# Patient Record
Sex: FEMALE | Race: WHITE | NOT HISPANIC OR LATINO | Employment: FULL TIME | URBAN - METROPOLITAN AREA
[De-identification: names, ages, dates, MRNs, and addresses within clinical notes are randomized per-mention and may not be internally consistent; named-entity substitution may affect disease eponyms.]

---

## 2024-01-18 ENCOUNTER — OFFICE VISIT (OUTPATIENT)
Dept: PRIMARY CARE | Facility: CLINIC | Age: 34
End: 2024-01-18
Payer: COMMERCIAL

## 2024-01-18 VITALS
DIASTOLIC BLOOD PRESSURE: 70 MMHG | BODY MASS INDEX: 31.9 KG/M2 | WEIGHT: 216 LBS | SYSTOLIC BLOOD PRESSURE: 112 MMHG | TEMPERATURE: 98.2 F

## 2024-01-18 DIAGNOSIS — M54.42 CHRONIC BILATERAL LOW BACK PAIN WITH BILATERAL SCIATICA: Primary | ICD-10-CM

## 2024-01-18 DIAGNOSIS — G89.29 CHRONIC BILATERAL LOW BACK PAIN WITH BILATERAL SCIATICA: Primary | ICD-10-CM

## 2024-01-18 DIAGNOSIS — M54.41 CHRONIC BILATERAL LOW BACK PAIN WITH BILATERAL SCIATICA: Primary | ICD-10-CM

## 2024-01-18 PROCEDURE — 99213 OFFICE O/P EST LOW 20 MIN: CPT | Performed by: FAMILY MEDICINE

## 2024-01-18 PROCEDURE — 1036F TOBACCO NON-USER: CPT | Performed by: FAMILY MEDICINE

## 2024-01-18 RX ORDER — CELECOXIB 200 MG/1
200 CAPSULE ORAL DAILY
Qty: 30 CAPSULE | Refills: 0 | Status: SHIPPED | OUTPATIENT
Start: 2024-01-18 | End: 2024-02-20 | Stop reason: ALTCHOICE

## 2024-01-18 ASSESSMENT — PATIENT HEALTH QUESTIONNAIRE - PHQ9
1. LITTLE INTEREST OR PLEASURE IN DOING THINGS: NOT AT ALL
2. FEELING DOWN, DEPRESSED OR HOPELESS: NOT AT ALL
SUM OF ALL RESPONSES TO PHQ9 QUESTIONS 1 AND 2: 0

## 2024-01-18 ASSESSMENT — ENCOUNTER SYMPTOMS: DEPRESSION: 0

## 2024-01-18 NOTE — PATIENT INSTRUCTIONS
I will prescribe celebrex for your back pain.  I will order an MRI of the lumbar spine.  Continue your exercises from physical therapy in the meantime.  Return in one month for a recheck.

## 2024-01-18 NOTE — PROGRESS NOTES
Subjective   Patient ID: [id number]     Milvia Villaseñor is a 33 y.o. female who presents for Back Pain (Patient states that the pain is also causing shortness of breath.).  HPI  She complains of back pain.  She has had it for years.  She states it has been worse over the last two years.  She has been sent for PT.  She completed a course of PT and does her stretching at home and it is not really helping.      She has pain in her legs , paresthesias.      She states the pain is causing her shortness of breath.  She has pain with moving around that makes her short of breath.  The pain is in the lower back and buttock region.          Objective     /70 (BP Location: Left arm, Patient Position: Sitting)   Temp 36.8 °C (98.2 °F) (Skin)   Wt 98 kg (216 lb)   BMI 31.90 kg/m²      Physical Exam  Constitutional:       Appearance: Normal appearance.   Musculoskeletal:         General: Tenderness (tender at lumbar paraspinals. spasm and guarding noted.) present. Normal range of motion.   Neurological:      Mental Status: She is alert.      Sensory: Sensory deficit (paresthesias in both legs.) present.      Motor: No weakness.      Coordination: Coordination normal.      Gait: Gait normal.      Deep Tendon Reflexes: Reflexes normal.         Assessment/Plan   Problem List Items Addressed This Visit    None  Visit Diagnoses       Chronic bilateral low back pain with bilateral sciatica    -  Primary    Relevant Medications    celecoxib (CeleBREX) 200 mg capsule    Other Relevant Orders    MR lumbar spine w and wo IV contrast        I will prescribe celebrex for your back pain.  I will order an MRI of the lumbar spine.  Continue your exercises from physical therapy in the meantime.  Return in one month for a recheck.    Jhon Snowden,

## 2024-01-30 ENCOUNTER — APPOINTMENT (OUTPATIENT)
Dept: RADIOLOGY | Facility: CLINIC | Age: 34
End: 2024-01-30
Payer: COMMERCIAL

## 2024-02-15 ENCOUNTER — APPOINTMENT (OUTPATIENT)
Dept: RADIOLOGY | Facility: HOSPITAL | Age: 34
End: 2024-02-15
Payer: COMMERCIAL

## 2024-02-15 ENCOUNTER — HOSPITAL ENCOUNTER (OUTPATIENT)
Dept: RADIOLOGY | Facility: HOSPITAL | Age: 34
Discharge: HOME | End: 2024-02-15
Payer: COMMERCIAL

## 2024-02-15 DIAGNOSIS — G89.29 CHRONIC BILATERAL LOW BACK PAIN WITH BILATERAL SCIATICA: ICD-10-CM

## 2024-02-15 DIAGNOSIS — M54.42 CHRONIC BILATERAL LOW BACK PAIN WITH BILATERAL SCIATICA: ICD-10-CM

## 2024-02-15 DIAGNOSIS — M54.41 CHRONIC BILATERAL LOW BACK PAIN WITH BILATERAL SCIATICA: ICD-10-CM

## 2024-02-15 PROCEDURE — 72158 MRI LUMBAR SPINE W/O & W/DYE: CPT

## 2024-02-15 PROCEDURE — 2550000001 HC RX 255 CONTRASTS: Performed by: FAMILY MEDICINE

## 2024-02-15 PROCEDURE — A9575 INJ GADOTERATE MEGLUMI 0.1ML: HCPCS | Performed by: FAMILY MEDICINE

## 2024-02-15 RX ORDER — GADOTERATE MEGLUMINE 376.9 MG/ML
19 INJECTION INTRAVENOUS
Status: COMPLETED | OUTPATIENT
Start: 2024-02-15 | End: 2024-02-15

## 2024-02-15 RX ADMIN — GADOTERATE MEGLUMINE 19 ML: 376.9 INJECTION INTRAVENOUS at 18:20

## 2024-02-16 ENCOUNTER — TELEPHONE (OUTPATIENT)
Dept: PRIMARY CARE | Facility: CLINIC | Age: 34
End: 2024-02-16
Payer: COMMERCIAL

## 2024-02-16 NOTE — TELEPHONE ENCOUNTER
Pt is calling asking is someone can give her a call to discuss her MRI results and what her next steps are now? Pt also wanted to know if she needed to follow up with you as well?

## 2024-02-17 DIAGNOSIS — M43.06 LUMBAR PARS DEFECT: ICD-10-CM

## 2024-02-17 DIAGNOSIS — M43.06 SPONDYLOLYSIS OF LUMBAR REGION: Primary | ICD-10-CM

## 2024-02-20 ENCOUNTER — TELEPHONE (OUTPATIENT)
Dept: PRIMARY CARE | Facility: CLINIC | Age: 34
End: 2024-02-20

## 2024-02-20 ENCOUNTER — OFFICE VISIT (OUTPATIENT)
Dept: PRIMARY CARE | Facility: CLINIC | Age: 34
End: 2024-02-20
Payer: COMMERCIAL

## 2024-02-20 VITALS
WEIGHT: 220.46 LBS | TEMPERATURE: 97.7 F | DIASTOLIC BLOOD PRESSURE: 82 MMHG | SYSTOLIC BLOOD PRESSURE: 134 MMHG | BODY MASS INDEX: 32.56 KG/M2

## 2024-02-20 DIAGNOSIS — M43.00 PARS DEFECT: ICD-10-CM

## 2024-02-20 DIAGNOSIS — M43.06 LUMBAR SPONDYLOLYSIS: Primary | ICD-10-CM

## 2024-02-20 DIAGNOSIS — M43.10 ANTEROLISTHESIS: ICD-10-CM

## 2024-02-20 PROCEDURE — 1036F TOBACCO NON-USER: CPT | Performed by: FAMILY MEDICINE

## 2024-02-20 PROCEDURE — 99213 OFFICE O/P EST LOW 20 MIN: CPT | Performed by: FAMILY MEDICINE

## 2024-02-20 NOTE — PROGRESS NOTES
Subjective   Patient ID: [id number]     Milvia Villaseñor is a 34 y.o. female who presents for Form Completion.  HPI  She is here regarding disability forms.  She was seen on 1/18/24 for back pain.  An MRI was done.  This showed a pars defect and possible spondylolysis.  Xrays were ordered to evaluate for this.  She had anterolisthesis of L% on S1 by 3 mm.      She has had bad pain for about a month. She has a neurosurgery appointment on 4/29/24.  She wants to get in with someone sooner.  I will order a neurosurgery referral which she will take to try [facility].      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 She has to lift shelves and bend down to get things.  She has to climb ladders up twelve feet.      Objective     /82 (BP Location: Right arm, Patient Position: Sitting)   Temp 36.5 °C (97.7 °F) (Skin)   Wt 100 kg (220 lb 7.4 oz)   BMI 32.56 kg/m²      Physical Exam  Constitutional:       Comments: Standing through exam.  Sitting hurts.     Musculoskeletal:      Comments: Tender at the lumbar spine.  Limited motion.  Feels more comfortable to stand.     Neurological:      Mental Status: She is alert.         Assessment/Plan   Problem List Items Addressed This Visit    None  Visit Diagnoses       Lumbar spondylolysis    -  Primary    Relevant Orders    Referral to Physical Therapy    Referral to Neurosurgery    Pars defect        Relevant Orders    Referral to Physical Therapy    Referral to Neurosurgery    Anterolisthesis        Relevant Orders    Referral to Physical Therapy    Referral to Neurosurgery        Disability forms were competed today.  Ordered neurosurgery referral and PT.    Jhon Snowden DO

## 2024-03-07 ENCOUNTER — HOSPITAL ENCOUNTER (OUTPATIENT)
Dept: RADIOLOGY | Facility: HOSPITAL | Age: 34
Discharge: HOME | End: 2024-03-07
Payer: COMMERCIAL

## 2024-03-07 ENCOUNTER — EVALUATION (OUTPATIENT)
Dept: PHYSICAL THERAPY | Facility: CLINIC | Age: 34
End: 2024-03-07
Payer: COMMERCIAL

## 2024-03-07 DIAGNOSIS — M43.06 LUMBAR SPONDYLOLYSIS: ICD-10-CM

## 2024-03-07 DIAGNOSIS — M43.00 PARS DEFECT: ICD-10-CM

## 2024-03-07 DIAGNOSIS — M43.10 ANTEROLISTHESIS: ICD-10-CM

## 2024-03-07 DIAGNOSIS — M43.06 LUMBAR PARS DEFECT: ICD-10-CM

## 2024-03-07 DIAGNOSIS — M43.06 SPONDYLOLYSIS OF LUMBAR REGION: ICD-10-CM

## 2024-03-07 PROCEDURE — 72110 X-RAY EXAM L-2 SPINE 4/>VWS: CPT

## 2024-03-07 PROCEDURE — 97110 THERAPEUTIC EXERCISES: CPT | Mod: GP

## 2024-03-07 PROCEDURE — 72110 X-RAY EXAM L-2 SPINE 4/>VWS: CPT | Performed by: RADIOLOGY

## 2024-03-07 PROCEDURE — 97161 PT EVAL LOW COMPLEX 20 MIN: CPT | Mod: GP

## 2024-03-07 NOTE — PROGRESS NOTES
PHYSICAL THERAPY EVALUATION AND TREATMENT    Patient Name: Milvia Villaseñor  MRN: [id number]  Today's Date: 3/8/2024  Time Calculation  Start Time: 1549  Stop Time: 1630  Time Calculation (min): 41 min    PT Evaluation Time Entry  PT Evaluation (Low) Time Entry: 26  PT Therapeutic Procedures Time Entry  Therapeutic Exercise Time Entry: 15                   Insurance:  Visit number: 1   Insurance Type: Payor: [employer]THEM  30 V PCY 0 used     Referred by: Jhon Snowden DO         Assessment:     Milvia Villaseñor  is a 34 y.o. old patient who participated in a physical therapy evaluation today due to recent acute flare up of chronic LBP. Pt presents with signs and symptoms consistent with no clear directional preference at evaluation - radiating symptoms not present during evaluation: pt falls into stabilization category. her impairments include: tenderness, strength deficits, pain, ROM deficits. Due to these impairments, she has the following functional limitations and participation restrictions: sleep, work tasks: lifting/twisting/ladders. Skilled physical therapy services are appropriate and beneficial in order to achieve measurable and meaningful change in the above objective tests and measures. Utilization of skilled physical therapy services will aid in advancing her functional status and attaining her therapy-related goals. The patient verbalized understanding and is in agreement with all goals and plan of care.  Plan of care was developed with input and agreement by the patient    Plan: Treatment/Interventions: Aquatic therapy, Cryotherapy, Dry needling, Education/ Instruction, Electrical stimulation, Gait training, Manual therapy, Neuromuscular re-education, Taping techniques, Therapeutic activities, Therapeutic exercises (no lumbar joint mobs or mechanical traction)  PT Plan: Skilled PT  PT Frequency: 1 time per week  Duration: 6-8 weeks  Rehab Potential: Good  Plan of Care Agreement: Patient  NV: assess  "response to HEP. Manual PRN. Core/hip/glute strengthening in neutral. See if Xray results in EMR.       Therapy Diagnosis:   1. Lumbar spondylolysis  Referral to Physical Therapy    Follow Up In Physical Therapy      2. Pars defect  Referral to Physical Therapy    Follow Up In Physical Therapy      3. Anterolisthesis  Referral to Physical Therapy    Follow Up In Physical Therapy          Subjective    Onset: 5 years ago  YONATHAN: none, gradual onset, recently worsening in February when putting on pants, pulled back   Pain location: mid low back, radiating down bilat buttocks down posterior and lateral thighs, ending at mid thigh   Pain description: \"humming\" and low back spasms  8/10 at worst; 0/10 at best  Worse with: bending, lifting and turning  Better with: heat used to help, laying flat on back   Occasional N/T in lateral hip/thigh bilat when laying on contralateral side   Denies bowel/bladder incontinence  Denies saddle anesthesia     Prior treatments/interventions: chirop[place] ago - [place], PT last year - [place] (felt strengthening was not as beneficial as stretching); Celebrex - [place]; has neurosurgery appointment 4/29 with Dr. Engel.     Home: lives with wife who has CA  PLOF: IND  CLOF: IND   Functional limitations: sleep: keeps her from sleeping and wakes her up,   Pt's goal: \"manage pain\"     Work:  at [employer], requires ladders and lifting (currently restricted to <10#s)  Exercise: walks at work, otherwise no  Hobbies: roller skating, yardwork    Diagnostic images: MRI from 1/18/24 copied from EMR:  \"FINDINGS:  The coronal  images demonstrate a dextrocurvature of the lumbar  spine.      There is approximately 3 mm of anterolisthesis of L5 on S1. There is  suspicion for bilateral L5 pars interarticularis  defects/spondylolysis. Further evaluation with plain films or limited  CT through the lower lumbar spine could be considered for  confirmation.      There are mild degenerative signal changes " noted along endplates  within lumbar region. A small Schmorl's node is noted along the  superior endplate of L1.      There is a 9 mm hemangioma within the L3 vertebral body.      The visualized spinal cord demonstrates no signal abnormality or  abnormal enhancement within it.  The conus medullaris is normally  positioned terminating at the  L1 level.      There is diminished disc signal at the L3/4 and L4/5 levels  compatible with degenerative disc disease.      At the L5/S1 level,  there is 3 mm of anterolisthesis of L5 on S1 as  well as suspicion for bilateral L5 pars interarticularis defects.  There is a mild posterior disc bulge and degenerative facet changes.  There is no significant narrowing of the spinal canal or neural  foramen.      At the L4/L5 level,  there is a focus of bright signal on the STIR  and T2 images noted along the posterior margin of the disc compatible  with an annular tear. There is mild posterior osteophytic spurring  and posterior disc bulge along with mild degenerative facet changes  contributing to mild encroachment upon the spinal canal. There is  very mild encroachment upon the inferior recesses of the neural  foramen bilaterally.      At the L3/L4 level,  there is mild posterior osteophytic spurring,  posterior disc bulge, and small superimposed central disc herniation  along with mild degenerative facet changes contributing to mild  overall encroachment upon the spinal canal. There is no significant  neural foraminal narrowing.      At the L2/L3 level,  there is a minimal posterior disc bulge without  significant spinal canal or neural foraminal narrowing.      At the L1/L2 level,  there is no significant spinal canal stenosis or  neuroforaminal stenosis.      At the T12/L1 level,  there is no significant spinal canal stenosis  or neuroforaminal stenosis.          IMPRESSION:  The coronal  images demonstrate a dextrocurvature of the lumbar  spine.      There is approximately  3 mm of anterolisthesis of L5 on S1. There is  suspicion for bilateral L5 pars interarticularis  defects/spondylolysis. Further evaluation with plain films or limited  CT through the lower lumbar spine could be considered for  confirmation.      There is multilevel lumbar spondylosis as described above.      MACRO:  None.    Medical History Form: Reviewed (scanned into chart)    Precautions:  Fall Risk: None   Denies: CA, pacemaker, DM, HTN, blood thinner medication use, latex allergy, epilepsy/seizures, or other known cardio/neuro/pulmonary problems   Denies unexpected weight loss/gain, night sweats, or night pain.    Previous surgeries include: none ortho related    Objective   Posture: WNL  Transfers: WNL  Bed Mobility: WNL  Gait: WNL    Numbness/Tingling/Paresthesia: denies    Dermatomes B LE:  EC WNL to light touch bilat unless otherwise noted  Mid-Anterior Thigh (L2):  Medial Knee (L3):  Medial Malleolus (L4):  Dorsal Second Toe Web Space (L5):  Lateral Malleolus (S1):    - Clonus bilat  - Mike's bilat     Myotomes/Strength (MMT in sitting):  Hip Flex (L2) R/L: 4+ / 4+   Hip Add R/L:  5 / 5  Hip Abd R/L: 5 / 5  Knee Ext (L3) R/L: 5 / 5  Knee Flex R/L: 5 / 5  Ankle DF (L4) R/L: 5 / 5  Ankle PF (S1) R/L:  5 / 5    Range of Motion/Repeated Movements:   *no pain in stand at baseline   Standing Lumbar Flexion: non limited   Repeated Loaded Flexion: no change   Standing Lumbar Ext: non limited   Repeated Loaded Ext: no change   Side Bend R/L: non limited no change   Rotation R/L: non limited no change     *3/10 pain in low back in supine   LTR R/L: INC R side LBP / INC L side LBP  Supine Flex (SKTC): no change/ no change   Prone Lie: DEC   MAXINE: INC   PPU: INC     Palpation:  TTP lower lumbar spinous processes     Outcome Measures:  Other Measures  Oswestry Disablity Index (POLLY): 12    Treatments:    Therapeutic Exercise  Education billed with Therapeutic Exercise:  -Reviewed relevant anatomy using diagrams/spine  model.   -Education provided on rationale for avoiding repeated end range movements due to possibility of pars fracture which pt is currently undergoing imaging for.   -Education provided on rationale for core strengthening exercises (patient had prior poor experience with PT). Advised patient to let PT know if exercises is not challenging enough.   - Discussed how to self assess appropriate level of challenge.     Access Code: [email or web address]  - Quadruped Alternating Leg Extensions  - 1 x daily - 3 x weekly - 3 sets - 10-15 reps  - Plank with Elbows on Table  - 1 x daily - 3 x weekly - 3-5 reps - 45-60 seconds hold    EDUCATION: Educated pt regarding benefit and purpose of skilled PT services along with results of examination findings and how this correlates to their chief complaint.  HEP updated. Ther ex cues provided along with rationale for interventions this date.     Goals:  Milvia MARKHAM Kaittwin will report one full day of work (full duty) with 75% less pain to indicate ability to return to work and ADLs without limitation.    Milvia Villaseñor will report 75% improvement in sleep status in order to attain adequate rest.    Milvia Carballoen will report 75% reduction in pain during ADLs to improve tolerance to household activities.    Milvia R Zavatchen  will improve Oswestry (POLLY) score by at least 12 points (minimal clinically important difference) in order to reflect improvement in ADL's/pain reduction. Baseline 03/08/24: 12/50, (POLLY score of </=10/50 (</=20%) represents minimal to no disability)    Milvia R Zavatchen to increase core/B LE strength in deficient muscles by >/= 1/2 MMT grade to improve dynamic stability during household/occupational/recreational tasks.    Milvia R Zavatchen will increase lumbar mobility to WFL/symmetrical without pain for unlimited ability to perform home household/occupational/recreational tasks.    Milvia R Zavatchen will demonstrate appropriate lifting technique with <2 cues for correction  using golfer's lift to pick objects off the floor (at home, and light objects at work) and with moving/handling heavy packages while protecting integrity of low back to safely perform ADLs/return to work.    Milvia Villaseñor will demonstrate independence and report compliance with HEP to facilitate independent rehab program upon discharge.

## 2024-03-20 PROBLEM — M47.816 LUMBAR SPONDYLOSIS: Status: ACTIVE | Noted: 2024-03-20

## 2024-03-20 PROBLEM — M43.00 PARS DEFECT: Status: ACTIVE | Noted: 2024-03-20

## 2024-03-20 PROBLEM — M43.10 ANTEROLISTHESIS: Status: ACTIVE | Noted: 2024-03-20

## 2024-03-20 NOTE — PROGRESS NOTES
"PHYSICAL THERAPY EVALUATION AND TREATMENT    Patient Name: Milvia Villaseñor  MRN: [id number]  Today's Date: 3/21/2024  Time Calculation  Start Time: 1716  Stop Time: 1759  Time Calculation (min): 43 min       PT Therapeutic Procedures Time Entry  Therapeutic Exercise Time Entry: 12  Therapeutic Activity Time Entry: 31                   Insurance:  Visit number: 2  Insurance Type: Payor: ANTHEM  30 V PCY 0 used     Referred by: No ref. provider found         Assessment:   Progress towards goals: Pt very receptive to provided  education and demonstrated good understanding of lifting techniques when practiced in clinic. Min cues required for proper TA engagement/pelvic stabilization with interventions initially but pt able to complete IND by end of session. Good TA activation observed throughout interventions.   Pt response to tx: No INC in pain levels present   Justification for skilled care: to reduce pain levels and improve strength levels/ awareness to help pt manage LBP and restore PLOF     Plan:     assess response to HEP updates. Manual PRN. Core/hip/glute strengthening in neutral.       Therapy Diagnosis:   1. Lumbar spondylosis        2. Pars defect  Follow Up In Physical Therapy      3. Anterolisthesis  Follow Up In Physical Therapy      4. Lumbar spondylolysis  Follow Up In Physical Therapy          Subjective    Reports 3/10 LBP currently. Reports no symptoms currently into LE's. Reports numbness in LE's happens when she is sleeping at night  She will feel lower hip flexor region pain on and off.     Precautions:  Fall Risk: None   Pronounces name \"Ritesh-a\"  **Lumbar spine pars defect present - see Xray for results**  Denies: CA, pacemaker, DM, HTN, blood thinner medication use, latex allergy, epilepsy/seizures, or other known cardio/neuro/pulmonary problems   Denies unexpected weight loss/gain, night sweats, or night pain.    Previous surgeries include: none ortho " related    Objective     Treatments:    Therapeutic Exercise  Access Code: [email or web address]  - Quadruped Alternating Leg Extensions - min cues for maintaining level pelvis and preventing INC lumbar spine extension  - glute mini bridges in neutral spine with TA, RTB above knees x 12 reps - HEP   - hooklying hip abd RTB with TA - easy  - s/l clamshells with TA, using RTB x 8-10 reps each LE - HEP     Verbal review only:   - Plank with Elbows on Table  - 1 x daily - 3 x weekly - 3-5 reps - 45-60 seconds hold    Therapeutic Activity   Back school completed in full with ADL packet given to pt as home reference   Educated pt regarding proper lifting technique with pt practicing lifting 10# box from plinth level height and from ground   Answered pt's questions regarding the following within scope of this PT's practice, instructing her to continue to follow up with surgeon regarding her questions:   - ROMs to limit with pars defect  - importance of core/trunk stabilization to manage condition  - degree of pars defect/anterolisthesis based upon recent Xray         Education: Home exercise program instructed and issued. Answered questions about home exercise program. Provided manual cues for correct performance of exercises. Provided verbal feedback to improve exercise technique.

## 2024-03-21 ENCOUNTER — TREATMENT (OUTPATIENT)
Dept: PHYSICAL THERAPY | Facility: CLINIC | Age: 34
End: 2024-03-21
Payer: COMMERCIAL

## 2024-03-21 DIAGNOSIS — M47.816 LUMBAR SPONDYLOSIS: Primary | ICD-10-CM

## 2024-03-21 DIAGNOSIS — M43.00 PARS DEFECT: ICD-10-CM

## 2024-03-21 DIAGNOSIS — M43.10 ANTEROLISTHESIS: ICD-10-CM

## 2024-03-21 DIAGNOSIS — M43.06 LUMBAR SPONDYLOLYSIS: ICD-10-CM

## 2024-03-21 PROCEDURE — 97530 THERAPEUTIC ACTIVITIES: CPT | Mod: GP

## 2024-03-21 PROCEDURE — 97110 THERAPEUTIC EXERCISES: CPT | Mod: GP

## 2024-03-26 NOTE — PROGRESS NOTES
"PHYSICAL THERAPY EVALUATION AND TREATMENT    Patient Name: Milvia Villaseñor  MRN: [id number]  Today's Date: 3/28/2024  Time Calculation  Start Time: 1732  Stop Time: 1815  Time Calculation (min): 43 min       PT Therapeutic Procedures Time Entry  Therapeutic Exercise Time Entry: 43                   Insurance:  Visit number: 3  Insurance Type: Payor: [employer]THEM  30 V PCY 0 used     Referred by: No ref. provider found         Assessment:   Progress towards goals: Pt tolerated progression of core strengthening w/o elevated pain when kept in neutral spine or slight flexion bias: did modify/withold ex that produce slight extension force on spine due to elevated sxs reported w/ these at home.   Pt response to tx: No INC in pain levels present   Justification for skilled care: to reduce pain levels and improve strength levels/ awareness to help pt manage LBP and restore PLOF     Plan:     assess response to HEP updates. Manual PRN. Core/hip/glute strengthening in neutral.       Therapy Diagnosis:   1. Lumbar spondylosis        2. Pars defect  Follow Up In Physical Therapy      3. Anterolisthesis  Follow Up In Physical Therapy      4. 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 Pt uses back brace intermittently.   Reports 5/10 LBP currently.   Reports pain and  numbness in B groin or mid lateral thigh  when she is sleeping at night  Relief w/ heat. Uses meds/m. Relaxers PRN.    Precautions:  Fall Risk: None   Pronounces name \"Ritesh-a\"  **Lumbar spine pars defect present - see Xray for results**  Denies: CA, pacemaker, DM, HTN, blood thinner medication use, latex allergy, epilepsy/seizures, or other known cardio/neuro/pulmonary problems   Denies unexpected weight loss/gain, night sweats, or night pain.    Previous surgeries include: none ortho related    Objective     Treatments:    Therapeutic " Exercise  Access Code: [email or web address]  - Supine Bridge with r Resistance Band  - 1 x daily - 3-7 x weekly - 2 sets - 12 reps  - Clamshell with y Resistance  - 1 x daily - 3-7 x weekly - 2 sets - 12 reps - 1-2 seconds hold  - Hooklying Small March  - 1 x daily - 3-7 x weekly - 2-3 sets - 10 reps  - pelvic tilt w/ stright leg raise   - 1 x daily - 3-7 x weekly - 1-2 sets - 10 reps  - Sidelying Reverse Clamshell  - 1 x daily - 3-7 x weekly - 2-3 sets - 10 reps  - Sidelying Hip Abduction  - 1 x daily - 3 x weekly - 2-3 sets - 10 reps  - Shoulder External Rotation and Scapular Retraction with y Resistance  - 1 x daily - 3-7 x weekly - 2-3 sets - 10 reps  - Standing Shoulder Horizontal Abduction with y Resistance  - 1 x daily - 7 x weekly - 2-3 sets - 10 reps    HELD:  - Quadruped Alternating Leg Extensions  - 1 x daily - 3 x weekly - 3 sets - 10-15 reps  - Plank with Elbows on Table  - 1 x daily - 3 x weekly - 3-5 reps - 45-60 seconds hold    Education: Home exercise program instructed and issued. Answered questions about home exercise program. Provided manual cues for correct performance of exercises. Provided verbal feedback to improve exercise technique.

## 2024-03-28 ENCOUNTER — TREATMENT (OUTPATIENT)
Dept: PHYSICAL THERAPY | Facility: CLINIC | Age: 34
End: 2024-03-28
Payer: COMMERCIAL

## 2024-03-28 DIAGNOSIS — M43.00 PARS DEFECT: ICD-10-CM

## 2024-03-28 DIAGNOSIS — M43.06 LUMBAR SPONDYLOLYSIS: ICD-10-CM

## 2024-03-28 DIAGNOSIS — M47.816 LUMBAR SPONDYLOSIS: Primary | ICD-10-CM

## 2024-03-28 DIAGNOSIS — M43.10 ANTEROLISTHESIS: ICD-10-CM

## 2024-03-28 PROCEDURE — 97110 THERAPEUTIC EXERCISES: CPT | Mod: GP,CQ

## 2024-04-04 ENCOUNTER — APPOINTMENT (OUTPATIENT)
Dept: PHYSICAL THERAPY | Facility: CLINIC | Age: 34
End: 2024-04-04
Payer: COMMERCIAL

## 2024-04-09 NOTE — PROGRESS NOTES
"PHYSICAL THERAPY EVALUATION AND TREATMENT    Patient Name: Milvia Villaseñor  MRN: [id number]  Today's Date: 4/9/2024                              Insurance:  Visit number: 4  Insurance Type: Payor: [employer]  30 V PCY 0 used     Referred by: No ref. provider found         Assessment:   Progress towards goals: Pt tolerated progression of core strengthening w/o elevated pain when kept in neutral spine or slight flexion bias: did modify/withold ex that produce slight extension force on spine due to elevated sxs reported w/ these at home.   Pt response to tx: No INC in pain levels present   Justification for skilled care: to reduce pain levels and improve strength levels/ awareness to help pt manage LBP and restore PLOF     Plan:     assess response to HEP updates. Manual PRN. Core/hip/glute strengthening in neutral.       Therapy Diagnosis:   1. Lumbar spondylosis        2. Pars defect        3. 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 Pt uses back brace intermittently.   Reports 5/10 LBP currently.   Reports pain and  numbness in B groin or mid lateral thigh  when she is sleeping at night  Relief w/ heat. Uses meds/m. Relaxers PRN.    Precautions:  Fall Risk: None   Pronounces name \"Ritesh-a\"  **Lumbar spine pars defect present - see Xray for results**  Denies: CA, pacemaker, DM, HTN, blood thinner medication use, latex allergy, epilepsy/seizures, or other known cardio/neuro/pulmonary problems   Denies unexpected weight loss/gain, night sweats, or night pain.    Previous surgeries include: none ortho related    Objective     Treatments:    Therapeutic Exercise  Access Code: [email or web address]  - Supine Bridge with r Resistance Band  - 1 x daily - 3-7 x weekly - 2 sets - 12 reps  - Clamshell with y Resistance  - 1 x daily - 3-7 x weekly - 2 sets - 12 reps - 1-2 seconds hold  - Hooklying Small March  - 1 x daily - 3-7 x weekly - 2-3 sets - " 10 reps  - pelvic tilt w/ stright leg raise   - 1 x daily - 3-7 x weekly - 1-2 sets - 10 reps  - Sidelying Reverse Clamshell  - 1 x daily - 3-7 x weekly - 2-3 sets - 10 reps  - Sidelying Hip Abduction  - 1 x daily - 3 x weekly - 2-3 sets - 10 reps  - Shoulder External Rotation and Scapular Retraction with y Resistance  - 1 x daily - 3-7 x weekly - 2-3 sets - 10 reps  - Standing Shoulder Horizontal Abduction with y Resistance  - 1 x daily - 7 x weekly - 2-3 sets - 10 reps    HELD:  - Quadruped Alternating Leg Extensions  - 1 x daily - 3 x weekly - 3 sets - 10-15 reps  - Plank with Elbows on Table  - 1 x daily - 3 x weekly - 3-5 reps - 45-60 seconds hold    Education: Home exercise program instructed and issued. Answered questions about home exercise program. Provided manual cues for correct performance of exercises. Provided verbal feedback to improve exercise technique.

## 2024-04-11 ENCOUNTER — DOCUMENTATION (OUTPATIENT)
Dept: PHYSICAL THERAPY | Facility: CLINIC | Age: 34
End: 2024-04-11

## 2024-04-11 ENCOUNTER — TREATMENT (OUTPATIENT)
Dept: PHYSICAL THERAPY | Facility: CLINIC | Age: 34
End: 2024-04-11
Payer: COMMERCIAL

## 2024-04-11 DIAGNOSIS — M43.10 ANTEROLISTHESIS: ICD-10-CM

## 2024-04-11 DIAGNOSIS — M47.816 LUMBAR SPONDYLOSIS: Primary | ICD-10-CM

## 2024-04-11 DIAGNOSIS — M43.00 PARS DEFECT: ICD-10-CM

## 2024-04-11 NOTE — PROGRESS NOTES
Physical Therapy                 Therapy Communication Note    Patient Name: Milvia Villaseñor  MRN: [id number]  Today's Date: 4/11/2024     Discipline: Physical Therapy    Missed Visit Reason:  via MyChart    Missed Time: Cancel    Comment:

## 2024-04-17 NOTE — PROGRESS NOTES
"PHYSICAL THERAPY TREATMENT    Patient Name: Milvia Villaseñor  MRN: [id number]  Today's Date: 4/18/2024  Time Calculation  Start Time: 1626  Stop Time: 1712  Time Calculation (min): 46 min       PT Therapeutic Procedures Time Entry  Manual Therapy Time Entry: 31  Therapeutic Exercise Time Entry: 15                   Insurance:  Visit number: 4  Insurance Type: Payor: ANTHEM  30 V PCY 0 used     Referred by: No ref. provider found         Assessment:   Progress towards goals: Able to progress portion of strengthening HEP with INC resistance/challenge without INC in pain levels. Good form and proper challenge present. Pelvis level when assessed. Trial of manual interventions to lumbar spine completed: good relief during but no major carry over when pt got up to ambulate.   Pt response to tx: No INC in pain levels present   Justification for skilled care: to reduce pain levels and improve strength levels/ awareness to help pt manage LBP and restore PLOF     Plan:  Re-assess nv: pt reports she is interested in potentially returning 2-3 weeks after next visit to assess her compliance/progress with HEP IND.    assess response to HEP updates. Core/hip/glute strengthening in neutral.       Therapy Diagnosis:   1. Lumbar spondylosis        2. Pars defect  Follow Up In Physical Therapy      3. Anterolisthesis  Follow Up In Physical Therapy      4. Lumbar spondylolysis  Follow Up In Physical Therapy          Subjective    Reports 4/10 LBP this AM but did some stretches and this helped. Current LBP rated 2/10. Reports no symptoms in thigh during the day. Reports she does have symptoms in her thighs when sleeping at night on her sides  Reports some decreased HEP compliance over last couple weeks.       Precautions:  Fall Risk: None   Pronounces name \"Imtiaznorbert-a\"  **Lumbar spine pars defect present - see Xray for results**  Denies: CA, pacemaker, DM, HTN, blood thinner medication use, latex allergy, epilepsy/seizures, or " other known cardio/neuro/pulmonary problems   Denies unexpected weight loss/gain, night sweats, or night pain.    Previous surgeries include: none ortho related    Objective     Treatments:    Therapeutic Exercise  Access Code: [email or web address]  - Sidelying Reverse Clamshell with YTB  x 12 reps each LE - HEP update   -Clamshell with RTB x 12 reps each LE - HEP update  - modified side plank on knees x 22 seconds each side - HEP  Educated pt regarding importance of good HEP compliance to achieve optimal rehab results. Educated pt regarding rationale for core stabilization     The following interventions were not performed this visit:   - Supine Bridge with r Resistance Band  - 1 x daily - 3-7 x weekly - 2 sets - 12 reps  - Hooklying Small March  - 1 x daily - 3-7 x weekly - 2-3 sets - 10 reps  - pelvic tilt w/ stright leg raise   - 1 x daily - 3-7 x weekly - 1-2 sets - 10 reps  - Sidelying Hip Abduction  - 1 x daily - 3 x weekly - 2-3 sets - 10 reps  - Shoulder External Rotation and Scapular Retraction with y Resistance  - 1 x daily - 3-7 x weekly - 2-3 sets - 10 reps  - Standing Shoulder Horizontal Abduction with y Resistance  - 1 x daily - 7 x weekly - 2-3 sets - 10 reps      Manual Therapy  Pelvic assessment - level at all landmarks   Completed in prone: STM to inferior B lumbar paraspinals  Completed in prone: myofascial cupping to L lumbar paraspinals - INC tenderness, HELD     Education: Home exercise program instructed and issued. Answered questions about home exercise program. Provided manual cues for correct performance of exercises. Provided verbal feedback to improve exercise technique.

## 2024-04-18 ENCOUNTER — TREATMENT (OUTPATIENT)
Dept: PHYSICAL THERAPY | Facility: CLINIC | Age: 34
End: 2024-04-18
Payer: COMMERCIAL

## 2024-04-18 DIAGNOSIS — M43.06 LUMBAR SPONDYLOLYSIS: ICD-10-CM

## 2024-04-18 DIAGNOSIS — M43.00 PARS DEFECT: ICD-10-CM

## 2024-04-18 DIAGNOSIS — M43.10 ANTEROLISTHESIS: ICD-10-CM

## 2024-04-18 DIAGNOSIS — M47.816 LUMBAR SPONDYLOSIS: Primary | ICD-10-CM

## 2024-04-18 PROCEDURE — 97110 THERAPEUTIC EXERCISES: CPT | Mod: GP

## 2024-04-18 PROCEDURE — 97140 MANUAL THERAPY 1/> REGIONS: CPT | Mod: GP

## 2024-04-22 NOTE — PROGRESS NOTES
"[facility] [place]  Department of Neurological Surgery  New Patient Visit    History of Present Illness:  Milvia Villaseñor is a 34 y.o. year old female who presents to the spine clinic with lower back pain that radiates to hip flexors and around to buttocks. She describes her symptoms as constant throbbing and along with spasms.    Nothing past hips  Prior Spine Surgeries: none  Physical Therapy: Has been five times since 3/21/24  Diabetic: No   Osteoporosis: Mother has osteoporosis  Patient's Body mass index is 33.3 kg/m².    Review of Systems:   systems reviewed and negative other than what is listed in the history of present illness    Patient Active Problem List   Diagnosis    Lumbar spondylosis    Pars defect    Anterolisthesis     Past Medical History:   Diagnosis Date    Celiac disease ([facility])     Personal history of other diseases of the nervous system and sense organs     History of Bell's palsy    Unspecified asthma, uncomplicated ([facility]) 10/03/2016    Childhood asthma     Past Surgical History:   Procedure Laterality Date    WISDOM TOOTH EXTRACTION       Social History     Tobacco Use    Smoking status: Former     Current packs/day: 0.00     Types: Cigarettes     Quit date:      Years since quittin.3    Smokeless tobacco: Never   Substance Use Topics    Alcohol use: Not Currently     family history includes \"sponge kidney\" in her mother; Cervical cancer in her mother; Colon cancer in her paternal grandfather; Endometriosis in her mother; Gout in her mother; Hypotension in her mother; Lupus in her mother; No Known Problems in her father; Rheum arthritis in her mother.    Current Outpatient Medications:     celecoxib (CeleBREX) 200 mg capsule, Take 1 capsule (200 mg) by mouth once daily., Disp: , Rfl:   Allergies   Allergen Reactions    Dimetapp Dm Cold-Cough (Pe) [Brompheniramin-Phenylephrin-Dm] Unknown       Physical Examination    General: Well developed, " awake/alert/oriented x3, no distress, alert and cooperative  Skin: Warm and dry, no lesions, no rashes  ENMT: Mucous membranes moist, no apparent injury, no lesions seen  Head/Neck: Neck Supple, no apparent injury  Respiratory/Thorax: Normal breath sounds with good chest expansion, thorax symmetric  Cardiovascular: No pitting edema, no JVD    Motor Strength: 5/5 Throughout all extremities    Muscle Bulk: Normal and symmetric in all extremities    Posture:   -- Cervical: Normal  -- Thoracic: Normal  -- Lumbar : Normal  Paraspinal muscle spasm/tenderness absent.     Sensation: intact to light touch      Results    I personally reviewed and interpreted the imaging results which included L5 S1 grade 1 spondylolisthesis, pars defects, and moderate foraminal stenosis.    Assessment and Plan:    Milvia Villaseñor is a 34 y.o. year old female who presents to the spine clinic with chronic back pain and lumbar radiculopathy. At this time will continue with physical therapy and conservative care. She will follow up with me if her symptoms worsen.    I have reviewed all prior documentation and reviewed the electronic medical record since admission. I have personally have reviewed all advanced imaging not just the reports and used my interpretation as documented as the relevant findings. I have reviewed the risks and benefits of all treatment recommendations listed in this note with the patient and family. I spent a total of 45 minutes in service to this patient's care during this date of service.      The above clinical summary has been dictated with voice recognition software. It has not been proofread for grammatical errors, typographical mistakes, or other semantic inconsistencies.    Thank you for visiting our office today. It was our pleasure to take part in your healthcare.     Do not hesitate to call with any questions regarding your plan of care after leaving at [phone] M-F 8am-4pm.     To clinicians, thank you  very much for this kind referral. It is a privilege to partner with you in the care of your patients. My office would be delighted to assist you with any further consultations or with questions regarding the plan of care outlined. Do not hesitate to call the office or contact me directly.       Sincerely,      Lopez Engel MD, [facility]  Spine , [facility]  Karan Douglas and Leatha Douglas Chair in Spinal Neurosurgery  Neurosurgery , [facility]  Complex Spine Surgery Fellowship Director   of Neurological Surgery  [employer]    [facility]  [address]  [place] [address]    [facility]  [address]  [address]  [place] [address]    Phone: [phone]  Fax: [phone]        Scribe Attestation  By signing my name below, IRocio Scribe   attest that this documentation has been prepared under the direction and in the presence of Lopez Engel MD.    Scribe Attestation  By signing my name below, IRadha Scribe, attest that this documentation has been prepared under the direction and in the presence of Lopez Engel MD.

## 2024-04-25 ENCOUNTER — TREATMENT (OUTPATIENT)
Dept: PHYSICAL THERAPY | Facility: CLINIC | Age: 34
End: 2024-04-25
Payer: COMMERCIAL

## 2024-04-25 DIAGNOSIS — M43.00 PARS DEFECT: ICD-10-CM

## 2024-04-25 DIAGNOSIS — M43.10 ANTEROLISTHESIS: ICD-10-CM

## 2024-04-25 DIAGNOSIS — M43.06 LUMBAR SPONDYLOLYSIS: ICD-10-CM

## 2024-04-25 DIAGNOSIS — M47.816 LUMBAR SPONDYLOSIS: Primary | ICD-10-CM

## 2024-04-25 PROCEDURE — 97112 NEUROMUSCULAR REEDUCATION: CPT | Mod: GP | Performed by: PHYSICAL THERAPIST

## 2024-04-25 PROCEDURE — 97530 THERAPEUTIC ACTIVITIES: CPT | Mod: GP | Performed by: PHYSICAL THERAPIST

## 2024-04-25 NOTE — PROGRESS NOTES
PHYSICAL THERAPY TREATMENT NOTE    Patient Name:  Milvia Villaseñor   Patient MRN: [id number]  Date: 04/25/24    Time Calculation  Start Time: 1551  Stop Time: 1648  Time Calculation (min): 57 min         PT Therapeutic Procedures Time Entry  Neuromuscular Re-Education Time Entry: 30  Therapeutic Activity Time Entry: 15                   Insurance:    Visit number: 5   Insurance Type: ANTHEM   30V Per plan year 4/1 - 3/31    Therapy diagnoses:   1. Lumbar spondylosis        2. Pars defect  Follow Up In Physical Therapy      3. Anterolisthesis  Follow Up In Physical Therapy      4. Lumbar spondylolysis  Follow Up In Physical Therapy           General:  Reason for visit: Re check    Referred by: Rebekah Booth MD appt:  Pedro Luis Engel - upcoming appt    Assessment:  Goal Review 4/25/24  Milvia Villaseñor will report one full day of work (full duty) with 75% less pain to indicate ability to return to work and ADLs without limitation.   4/25: no change  Milvia Villaseñor will report 75% improvement in sleep status in order to attain adequate rest.    4/25: Reports 25%  improvement  Milvia Villaseñor will report 75% reduction in pain during ADLs to improve tolerance to household activities.    4/25: Reports reduction in spasms  Milvia Villaseñor  will improve Oswestry (POLLY) score by at least 12 points (minimal clinically important difference) in order to reflect improvement in ADL's/pain reduction. Baseline 03/08/24: 12/50, (POLLY score of </=10/50 (</=20%) represents minimal to no disability)    4/25: 12% POLLY   Milvia Carballoen to increase core/B LE strength in deficient muscles by >/= 1/2 MMT grade to improve dynamic stability during household/occupational/recreational tasks.    4/25: Feels more stable with functional activities and planking   Milvia Carballoen will increase lumbar mobility to WFL/symmetrical  "without pain for unlimited ability to perform home household/occupational/recreational tasks.   4/25: achieved     Milvia Villaseñor will demonstrate appropriate lifting technique with <2 cues for correction using golfer's lift to pick objects off the floor (at home, and light objects at work) and with moving/handling heavy packages while protecting integrity of low back to safely perform ADLs/return to work.  4/25: achieved    Milvia Villaseñor will demonstrate independence and report compliance with HEP to facilitate independent rehab program upon discharge.   4/25: achieved    Plan:  Pt to follow up with neuro surgeon and will contact PT clinic if needed. Otherwise discharge chart in 30 days. Pt made progress toward learning HEP and was receptive to pain science education today       Precautions:  none  Fall Risk: none    Subjective:   Patient reports Has learned a with    Pain (0-10): \"sore - uncomfortable like period cramps\"    HEP adherence / understanding: yes    Objective:  Lumbar  and NATHAN LE AROM and strength WFL  Good knowledge of HEP   POLLY 6    Treatment Performed: (\"NP\" = Not Performed)     Therapeutic Exercise:       minutes  Discussed yoga, swimming, elliptcial as exercise   Manual Therapy:       minutes    STM to inferior B lumbar paraspinals   Neuromuscular Re-education:    30 minutes  Pain Science x 30 minutes: Pain meeting, pain sliding scale, house on fire / candle analogy      Gait Training:            minutes        Therapeutic Activity:    15 minutes    Goal and function review                            "

## 2024-04-29 ENCOUNTER — OFFICE VISIT (OUTPATIENT)
Dept: NEUROSURGERY | Facility: CLINIC | Age: 34
End: 2024-04-29
Payer: COMMERCIAL

## 2024-04-29 VITALS
SYSTOLIC BLOOD PRESSURE: 110 MMHG | BODY MASS INDEX: 33.19 KG/M2 | HEART RATE: 96 BPM | DIASTOLIC BLOOD PRESSURE: 70 MMHG | TEMPERATURE: 98.4 F | HEIGHT: 68 IN | WEIGHT: 219 LBS

## 2024-04-29 DIAGNOSIS — M43.06 SPONDYLOLYSIS OF LUMBAR REGION: ICD-10-CM

## 2024-04-29 DIAGNOSIS — M43.06 LUMBAR PARS DEFECT: ICD-10-CM

## 2024-04-29 PROCEDURE — 99204 OFFICE O/P NEW MOD 45 MIN: CPT | Performed by: STUDENT IN AN ORGANIZED HEALTH CARE EDUCATION/TRAINING PROGRAM

## 2024-04-29 PROCEDURE — 1036F TOBACCO NON-USER: CPT | Performed by: STUDENT IN AN ORGANIZED HEALTH CARE EDUCATION/TRAINING PROGRAM

## 2024-04-29 RX ORDER — CELECOXIB 200 MG/1
200 CAPSULE ORAL
COMMUNITY
Start: 2024-01-18 | End: 2024-07-16

## 2024-04-29 ASSESSMENT — PATIENT HEALTH QUESTIONNAIRE - PHQ9
2. FEELING DOWN, DEPRESSED OR HOPELESS: NOT AT ALL
SUM OF ALL RESPONSES TO PHQ9 QUESTIONS 1 & 2: 0
1. LITTLE INTEREST OR PLEASURE IN DOING THINGS: NOT AT ALL

## 2024-04-29 ASSESSMENT — LIFESTYLE VARIABLES
HOW OFTEN DO YOU HAVE SIX OR MORE DRINKS ON ONE OCCASION: NEVER
AUDIT-C TOTAL SCORE: 3
HOW OFTEN DO YOU HAVE A DRINK CONTAINING ALCOHOL: 2-3 TIMES A WEEK
SKIP TO QUESTIONS 9-10: 1
HOW MANY STANDARD DRINKS CONTAINING ALCOHOL DO YOU HAVE ON A TYPICAL DAY: 1 OR 2

## 2024-04-29 ASSESSMENT — PAIN SCALES - GENERAL: PAINLEVEL: 5
